# Patient Record
Sex: MALE | Race: WHITE | NOT HISPANIC OR LATINO
[De-identification: names, ages, dates, MRNs, and addresses within clinical notes are randomized per-mention and may not be internally consistent; named-entity substitution may affect disease eponyms.]

---
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
## 2021-06-06 ENCOUNTER — TRANSCRIPTION ENCOUNTER (OUTPATIENT)
Age: 21
End: 2021-06-06

## 2021-11-23 ENCOUNTER — NON-APPOINTMENT (OUTPATIENT)
Age: 21
End: 2021-11-23

## 2021-11-23 ENCOUNTER — APPOINTMENT (OUTPATIENT)
Dept: ORTHOPEDIC SURGERY | Facility: CLINIC | Age: 21
End: 2021-11-23
Payer: COMMERCIAL

## 2021-11-23 DIAGNOSIS — M25.561 PAIN IN RIGHT KNEE: ICD-10-CM

## 2021-11-23 DIAGNOSIS — S83.421A SPRAIN OF LATERAL COLLATERAL LIGAMENT OF RIGHT KNEE, INITIAL ENCOUNTER: ICD-10-CM

## 2021-11-23 PROCEDURE — 99204 OFFICE O/P NEW MOD 45 MIN: CPT

## 2021-11-24 PROBLEM — S83.421A SPRAIN OF LATERAL COLLATERAL LIGAMENT OF RIGHT KNEE, INITIAL ENCOUNTER: Status: ACTIVE | Noted: 2021-11-24

## 2021-11-24 PROBLEM — M25.561 RIGHT KNEE PAIN: Noted: 2021-11-23

## 2021-11-24 NOTE — DISCUSSION/SUMMARY
[de-identified] : Discussed findings of today's exam and possible causes of patient's pain.  Educated patient on their most probable diagnosis of right knee pain due to lateral collateral ligament sprain and mild posterior lateral corner injury.  Reviewed possible courses of treatment, and we collaboratively decided best course of treatment at this time will include conservative management.  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  There is no acute internal derangement, no evidence of lateral meniscus tear.  Patient has a mild sprain of the ACL posterior bundle on his MRI, he is advised that this is likely an incidental finding, I do not feel this is related to his recent injury, and his ACL feels stable on clinical exam today.  Patient was given a knee immobilizer, I do not recommend utilization of this, he was transitioned into a hinged knee brace today to allow for more appropriate recovery.  Recommend he proceed with knee brace and physical therapy at this time, he will likely need this level of intervention for the next 2-3 weeks, then we can reassess his progress and discuss appropriate timing for return to wrestling activity.  Patient can do some simple low impact exercise such as bike/elliptical as tolerated to help maintain weight and motion in the knee as well as cardiovascular fitness.  Recommend follow-up in 3 weeks when he is back home on his next break from vacation, we can determine his clinical progress and timing for return to wrestling activity at that time.  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  Patient is unlikely to require any knee brace when returning to wrestling activity once he has had full recovery through physical therapy from this issue.  Patient may continue taking Aleve as needed, advised to take 2 tablets with food, 2 times a day for the next 1 to 2 weeks (We discussed all possible side effects of this medication).  Patient and his father appreciate and agree with current plan.\par \par I work as part of an academic orthopedic group and routinely have a physician in training (resident / fellow) working with me.  Any part of the history and physical exam performed by the physician in training was either directly reviewed and/or replicated by myself.  Any procedure performed by the physician in training was performed under my direct supervision and with the consent of the patient.\par \par This note was generated using dragon medical dictation software.  A reasonable effort has been made for proofreading its contents, but typos may still remain.  If there are any questions or points of clarification needed please notify my office.\par

## 2021-11-24 NOTE — HISTORY OF PRESENT ILLNESS
[de-identified] : Patient is here for right knee pain that began 2 weeks ago when he was wrestling. He was seen by his ATC at school. He was sent for an MRI. He has used crutches and a knee immobilizer to treat it. He was told to start PT but then was told by the PT that there were exercises he should not be doing due to his injury to his ACL He is a student. \par \par The patient's past medical history, past surgical history, medications and allergies were reviewed by me today and documented accordingly. In addition, the patient's family and social history, which were noncontributory to this visit, were reviewed also. Intake form was reviewed. The patient has no family history of arthritis.

## 2021-11-24 NOTE — PHYSICAL EXAM
[de-identified] : Constitutional: Well-nourished, well-developed, No acute distress\par Respiratory:  Good respiratory effort, no SOB\par Lymphatic: No regional lymphadenopathy, no lymphedema\par Psychiatric: Pleasant and normal affect, alert and oriented x3\par Musculoskeletal: normal except where as noted in regional exam\par \par Right knee:\par APPEARANCE: Mild intra-articular swelling, no marked deformities or malalignment\par POSITIVE TENDERNESS: Mild tenderness of lateral joint line, lateral retinaculum and posterior lateral corner\par NONTENDER: Medial joint line, patellar & quadriceps tendons, MCL/LCL, ITB at the lateral femoral condyle & Gerdy's tubercle, pes bursa. \par ROM: full Extension, flexion limited to 120 degrees due to stiffness and pain  \par RESISTIVE TESTING: painless resisted knee flex/ext. \par SPECIAL TESTS: Mild pain but no significant laxity with varus stress test, stable valgus stress. painless grind. neg Lachman's. neg ant/post drawer. neg Teresita's. \par  [de-identified] : I reviewed, interpreted and clinically correlated the following outside imaging studies,\par MRI of the right knee performed in [place]\par Evidence of LCL sprain without significant or complete tear, mild sprain of the conjoined tendon at the posterior lateral corner, no evidence of lateral meniscus tear.  Evidence of a mild sprain of the posterior bundle of the ACL at its attachment on the tibia

## 2024-06-26 ENCOUNTER — NON-APPOINTMENT (OUTPATIENT)
Age: 24
End: 2024-06-26